# Patient Record
Sex: MALE | Race: WHITE | NOT HISPANIC OR LATINO
[De-identification: names, ages, dates, MRNs, and addresses within clinical notes are randomized per-mention and may not be internally consistent; named-entity substitution may affect disease eponyms.]

---

## 2018-11-19 ENCOUNTER — RECORD ABSTRACTING (OUTPATIENT)
Age: 73
End: 2018-11-19

## 2018-11-19 DIAGNOSIS — H26.9 UNSPECIFIED CATARACT: ICD-10-CM

## 2018-11-19 DIAGNOSIS — Z87.19 PERSONAL HISTORY OF OTHER DISEASES OF THE DIGESTIVE SYSTEM: ICD-10-CM

## 2018-11-19 DIAGNOSIS — G61.81 CHRONIC INFLAMMATORY DEMYELINATING POLYNEURITIS: ICD-10-CM

## 2018-11-19 DIAGNOSIS — Z86.19 PERSONAL HISTORY OF OTHER INFECTIOUS AND PARASITIC DISEASES: ICD-10-CM

## 2018-12-13 ENCOUNTER — APPOINTMENT (OUTPATIENT)
Dept: INTERNAL MEDICINE | Facility: CLINIC | Age: 73
End: 2018-12-13
Payer: COMMERCIAL

## 2018-12-13 ENCOUNTER — TRANSCRIPTION ENCOUNTER (OUTPATIENT)
Age: 73
End: 2018-12-13

## 2018-12-13 VITALS
BODY MASS INDEX: 24.05 KG/M2 | SYSTOLIC BLOOD PRESSURE: 100 MMHG | HEART RATE: 56 BPM | WEIGHT: 168 LBS | DIASTOLIC BLOOD PRESSURE: 42 MMHG | HEIGHT: 70 IN

## 2018-12-13 LAB
BILIRUB UR QL STRIP: NORMAL
GLUCOSE UR-MCNC: NORMAL
HCG UR QL: NORMAL EU/DL
HGB UR QL STRIP.AUTO: NORMAL
KETONES UR-MCNC: NORMAL
LEUKOCYTE ESTERASE UR QL STRIP: NORMAL
NITRITE UR QL STRIP: NORMAL
PH UR STRIP: 5
PROT UR STRIP-MCNC: NORMAL
SP GR UR STRIP: 1.01

## 2018-12-13 PROCEDURE — 99243 OFF/OP CNSLTJ NEW/EST LOW 30: CPT | Mod: 25

## 2018-12-13 PROCEDURE — 81003 URINALYSIS AUTO W/O SCOPE: CPT | Mod: QW

## 2018-12-13 NOTE — HISTORY OF PRESENT ILLNESS
[Atrial Fibrillation] : atrial fibrillation [Chronic Anticoagulation] : chronic anticoagulation [Aortic Stenosis] : no aortic stenosis [Coronary Artery Disease] : no coronary artery disease [Recent Myocardial Infarction] : no recent myocardial infarction [Implantable Device/Pacemaker] : no implantable device/pacemaker [Asthma] : no asthma [COPD] : no COPD [Sleep Apnea] : no sleep apnea [Smoker] : not a smoker [Family Member] : no family member with adverse anesthesia reaction/sudden death [Self] : no previous adverse anesthesia reaction [Chronic Kidney Disease] : no chronic kidney disease [Diabetes] : no diabetes [FreeTextEntry4] : preop clearance requested by Dr. Harshad Nino prior to left inguinal hernia surgery on December 17, 2018 at [place]

## 2018-12-13 NOTE — PHYSICAL EXAM

## 2018-12-13 NOTE — REVIEW OF SYSTEMS
[Fever] : no fever [Chills] : no chills [Vision Problems] : no vision problems [Earache] : no earache [Chest Pain] : no chest pain [Palpitations] : no palpitations [Orthopena] : no orthopnea [Paroysmal Nocturnal Dyspnea] : no paroysmal nocturnal dyspnea [Shortness Of Breath] : no shortness of breath [Wheezing] : no wheezing [Cough] : no cough [Dyspnea on Exertion] : not dyspnea on exertion [Abdominal Pain] : no abdominal pain [Nausea] : no nausea [Vomiting] : no vomiting [Heartburn] : no heartburn [Dysuria] : no dysuria [Hematuria] : no hematuria [Frequency] : no frequency [Joint Pain] : no joint pain [Skin Rash] : no skin rash [Headache] : no headache [Dizziness] : no dizziness [Depression] : no depression [Easy Bleeding] : no easy bleeding [Easy Bruising] : no easy bruising [de-identified] : +CIPD (chronic inflammatory polyneuropathy disease)

## 2018-12-13 NOTE — ASSESSMENT
[FreeTextEntry4] : I reviewed all the laboratory data from today including EKG done at [facility]. plan with within normal limits. He is medically cleared to have surgery. hold his Coumadin for 3 days prior to surgery and to restart the day of surgery. no aspirin or NSAIDs  prior to surgery.

## 2019-06-07 ENCOUNTER — TRANSCRIPTION ENCOUNTER (OUTPATIENT)
Age: 74
End: 2019-06-07

## 2019-09-26 ENCOUNTER — APPOINTMENT (OUTPATIENT)
Dept: INTERNAL MEDICINE | Facility: CLINIC | Age: 74
End: 2019-09-26
Payer: COMMERCIAL

## 2019-09-26 ENCOUNTER — NON-APPOINTMENT (OUTPATIENT)
Age: 74
End: 2019-09-26

## 2019-09-26 VITALS
WEIGHT: 164 LBS | SYSTOLIC BLOOD PRESSURE: 100 MMHG | HEART RATE: 56 BPM | BODY MASS INDEX: 23.53 KG/M2 | DIASTOLIC BLOOD PRESSURE: 50 MMHG

## 2019-09-26 DIAGNOSIS — G90.09 OTHER IDIOPATHIC PERIPHERAL AUTONOMIC NEUROPATHY: ICD-10-CM

## 2019-09-26 PROCEDURE — 99215 OFFICE O/P EST HI 40 MIN: CPT | Mod: 25

## 2019-09-26 PROCEDURE — 93000 ELECTROCARDIOGRAM COMPLETE: CPT

## 2019-09-26 PROCEDURE — G0444 DEPRESSION SCREEN ANNUAL: CPT

## 2019-09-26 PROCEDURE — 36415 COLL VENOUS BLD VENIPUNCTURE: CPT

## 2019-09-26 NOTE — PHYSICAL EXAM
[No Acute Distress] : no acute distress [Well Nourished] : well nourished [Well Developed] : well developed [Well-Appearing] : well-appearing [Normal Sclera/Conjunctiva] : normal sclera/conjunctiva [PERRL] : pupils equal round and reactive to light [EOMI] : extraocular movements intact [Normal Outer Ear/Nose] : the outer ears and nose were normal in appearance [Normal Oropharynx] : the oropharynx was normal [No JVD] : no jugular venous distention [No Lymphadenopathy] : no lymphadenopathy [Supple] : supple [Thyroid Normal, No Nodules] : the thyroid was normal and there were no nodules present [No Respiratory Distress] : no respiratory distress  [No Accessory Muscle Use] : no accessory muscle use [Clear to Auscultation] : lungs were clear to auscultation bilaterally [Normal Rate] : normal rate  [Normal S1, S2] : normal S1 and S2 [Regular Rhythm] : with a regular rhythm [No Murmur] : no murmur heard [No Carotid Bruits] : no carotid bruits [No Abdominal Bruit] : a ~M bruit was not heard ~T in the abdomen [No Varicosities] : no varicosities [Pedal Pulses Present] : the pedal pulses are present [No Edema] : there was no peripheral edema [No Palpable Aorta] : no palpable aorta [No Extremity Clubbing/Cyanosis] : no extremity clubbing/cyanosis [Non Tender] : non-tender [Soft] : abdomen soft [Non-distended] : non-distended [No Masses] : no abdominal mass palpated [No HSM] : no HSM [Normal Posterior Cervical Nodes] : no posterior cervical lymphadenopathy [Normal Bowel Sounds] : normal bowel sounds [No CVA Tenderness] : no CVA  tenderness [Normal Anterior Cervical Nodes] : no anterior cervical lymphadenopathy [No Spinal Tenderness] : no spinal tenderness [No Joint Swelling] : no joint swelling [Grossly Normal Strength/Tone] : grossly normal strength/tone [Coordination Grossly Intact] : coordination grossly intact [No Rash] : no rash [No Focal Deficits] : no focal deficits [Normal Gait] : normal gait [Deep Tendon Reflexes (DTR)] : deep tendon reflexes were 2+ and symmetric [Normal Affect] : the affect was normal [Normal Insight/Judgement] : insight and judgment were intact [de-identified] : right mild but dense left cataract

## 2019-09-26 NOTE — HISTORY OF PRESENT ILLNESS
[Atrial Fibrillation] : atrial fibrillation [No Pertinent Pulmonary History] : no history of asthma, COPD, sleep apnea, or smoking [No Adverse Anesthesia Reaction] : no adverse anesthesia reaction in self or family member [Chronic Anticoagulation] : chronic anticoagulation [Coronary Artery Disease] : no coronary artery disease [Aortic Stenosis] : no aortic stenosis [Recent Myocardial Infarction] : no recent myocardial infarction [Implantable Device/Pacemaker] : no implantable device/pacemaker [Asthma] : no asthma [COPD] : no COPD [Sleep Apnea] : no sleep apnea [Smoker] : not a smoker [Diabetes] : no diabetes [Chronic Kidney Disease] : no chronic kidney disease [FreeTextEntry1] : cataract surgeries [FreeTextEntry2] : 10.2 and 10.23.2019 [FreeTextEntry4] : here prior to anticipated cataract operations. He currently feels well and has had IRs in the 1.8-1.9 range without evidence of excessive bleeding. [FreeTextEntry3] : Pawel

## 2019-09-26 NOTE — PLAN
[FreeTextEntry1] : check labs, and PTT\par Refill meds but he will obtain his flecainide from his cardiologist in the future\par Return p.r.n.

## 2019-09-26 NOTE — REVIEW OF SYSTEMS
[Hearing Loss] : hearing loss [Chills] : no chills [Fever] : no fever [Vision Problems] : no vision problems [Earache] : no earache [Chest Pain] : no chest pain [Palpitations] : no palpitations [Orthopena] : no orthopnea [Shortness Of Breath] : no shortness of breath [Wheezing] : no wheezing [Cough] : no cough [Dyspnea on Exertion] : not dyspnea on exertion [Abdominal Pain] : no abdominal pain [Nausea] : no nausea [Vomiting] : no vomiting [Heartburn] : no heartburn [Dysuria] : no dysuria [Hematuria] : no hematuria [Joint Pain] : no joint pain [Frequency] : no frequency [Skin Rash] : no skin rash [Headache] : no headache [Depression] : no depression [Dizziness] : no dizziness [Easy Bruising] : no easy bruising [Easy Bleeding] : no easy bleeding [FreeTextEntry3] : Last eye exam 1 week ago [de-identified] : +CIPD (chronic inflammatory polyneuropathy disease)

## 2019-09-26 NOTE — ASSESSMENT
[Patient Optimized for Surgery] : Patient optimized for surgery [ECG] : ECG [Modify anticoagulant treatment prior to procedure] : Modify anticoagulant treatment prior to procedure [FreeTextEntry4] :  He is medically cleared to have surgery. I would hold his Coumadin for 3 days prior to surgery and to restart the day of surgery. No aspirin or NSAIDs  prior to surgery.

## 2019-09-28 ENCOUNTER — RESULT REVIEW (OUTPATIENT)
Age: 74
End: 2019-09-28

## 2019-09-28 LAB
ALBUMIN SERPL ELPH-MCNC: 4.5 G/DL
ALP BLD-CCNC: 51 U/L
ALT SERPL-CCNC: 22 U/L
ANION GAP SERPL CALC-SCNC: 13 MMOL/L
APPEARANCE: CLEAR
AST SERPL-CCNC: 25 U/L
BASOPHILS # BLD AUTO: 0.02 K/UL
BASOPHILS NFR BLD AUTO: 0.5 %
BILIRUB SERPL-MCNC: 0.5 MG/DL
BILIRUBIN URINE: NEGATIVE
BLOOD URINE: NEGATIVE
BUN SERPL-MCNC: 21 MG/DL
CALCIUM SERPL-MCNC: 9.9 MG/DL
CHLORIDE SERPL-SCNC: 99 MMOL/L
CHOLEST SERPL-MCNC: 201 MG/DL
CHOLEST/HDLC SERPL: 4.3 RATIO
CO2 SERPL-SCNC: 26 MMOL/L
COLOR: YELLOW
CREAT SERPL-MCNC: 0.85 MG/DL
EOSINOPHIL # BLD AUTO: 0.14 K/UL
EOSINOPHIL NFR BLD AUTO: 3.2 %
GLUCOSE QUALITATIVE U: NEGATIVE
GLUCOSE SERPL-MCNC: 117 MG/DL
HCT VFR BLD CALC: 43.8 %
HCV AB SER QL: NONREACTIVE
HCV S/CO RATIO: 0.25 S/CO
HDLC SERPL-MCNC: 47 MG/DL
HGB BLD-MCNC: 14 G/DL
IMM GRANULOCYTES NFR BLD AUTO: 0.7 %
INR PPP: 1.97 RATIO
KETONES URINE: NEGATIVE
LDLC SERPL CALC-MCNC: 129 MG/DL
LEUKOCYTE ESTERASE URINE: NEGATIVE
LYMPHOCYTES # BLD AUTO: 0.58 K/UL
LYMPHOCYTES NFR BLD AUTO: 13.3 %
MAN DIFF?: NORMAL
MCHC RBC-ENTMCNC: 32 GM/DL
MCHC RBC-ENTMCNC: 32.6 PG
MCV RBC AUTO: 101.9 FL
MONOCYTES # BLD AUTO: 0.54 K/UL
MONOCYTES NFR BLD AUTO: 12.4 %
NEUTROPHILS # BLD AUTO: 3.04 K/UL
NEUTROPHILS NFR BLD AUTO: 69.9 %
NITRITE URINE: NEGATIVE
PH URINE: 6
PLATELET # BLD AUTO: 216 K/UL
POTASSIUM SERPL-SCNC: 5 MMOL/L
PROT SERPL-MCNC: 7.5 G/DL
PROTEIN URINE: NEGATIVE
PSA SERPL-MCNC: 2.69 NG/ML
PT BLD: 22.9 SEC
RBC # BLD: 4.3 M/UL
RBC # FLD: 14.1 %
SODIUM SERPL-SCNC: 138 MMOL/L
SPECIFIC GRAVITY URINE: 1.02
TRIGL SERPL-MCNC: 127 MG/DL
TSH SERPL-ACNC: 1.96 UIU/ML
UROBILINOGEN URINE: NORMAL
VIT B12 SERPL-MCNC: 683 PG/ML
WBC # FLD AUTO: 4.35 K/UL

## 2019-09-30 LAB — METHYLMALONATE SERPL-SCNC: 235 NMOL/L

## 2019-10-02 DIAGNOSIS — Z87.438 PERSONAL HISTORY OF OTHER DISEASES OF MALE GENITAL ORGANS: ICD-10-CM

## 2019-10-04 ENCOUNTER — APPOINTMENT (OUTPATIENT)
Dept: CARDIOLOGY | Facility: CLINIC | Age: 74
End: 2019-10-04
Payer: COMMERCIAL

## 2019-10-04 VITALS
SYSTOLIC BLOOD PRESSURE: 110 MMHG | DIASTOLIC BLOOD PRESSURE: 70 MMHG | WEIGHT: 164 LBS | HEART RATE: 53 BPM | BODY MASS INDEX: 23.53 KG/M2 | OXYGEN SATURATION: 97 %

## 2019-10-04 DIAGNOSIS — Z86.69 PERSONAL HISTORY OF OTHER DISEASES OF THE NERVOUS SYSTEM AND SENSE ORGANS: ICD-10-CM

## 2019-10-04 DIAGNOSIS — Z86.39 PERSONAL HISTORY OF OTHER ENDOCRINE, NUTRITIONAL AND METABOLIC DISEASE: ICD-10-CM

## 2019-10-04 PROCEDURE — 93000 ELECTROCARDIOGRAM COMPLETE: CPT

## 2019-10-04 PROCEDURE — 99205 OFFICE O/P NEW HI 60 MIN: CPT

## 2019-10-06 ENCOUNTER — NON-APPOINTMENT (OUTPATIENT)
Age: 74
End: 2019-10-06

## 2019-10-06 PROBLEM — Z86.39 HISTORY OF HYPERLIPIDEMIA: Status: RESOLVED | Noted: 2019-10-06 | Resolved: 2019-10-06

## 2019-10-06 PROBLEM — Z86.69 HISTORY OF DEAFNESS: Status: RESOLVED | Noted: 2019-10-06 | Resolved: 2019-10-06

## 2019-10-06 NOTE — HISTORY OF PRESENT ILLNESS
[FreeTextEntry1] : 74-year-old\par Cardiology consultation requested by Dr. Madison because of paroxysmal atrial fibrillation\par \par  Enmanuel carries a diagnosis of paroxysmal atrial fibrillation. There is no history of a myocardial infarction angina or congestive heart failure. Paroxysmal atrial fibrillation was initially detected in 2010 manifested by palpitations. The electrocardiographic documentation of atrial fibrillation is not available. Prior treatment with amiodarone and sotalol were either ineffective or not tolerated. He has been maintained on vitamin K antagonist therapy\par \par A stress sestamibi study prior to 2010 was reportedly normal. A subsequent stress study was also described as  normal. Those studies are not available for review. A 3/12 echocardiogram demonstrated an increase in left atrial volume at 37 mL/m2 with normal left atrial diameter. Trace/physiological mitral regurgitation was noted. 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 He describes these palpitations as representing atrial  fibrillation.  One episode was alleviated by coughing. Fuentes denies exertional chest pain or syncope.\par \par There is no history of hypertension or diabetes. He has known hyperlipidemia. Prior treatment with HMG coag reductase inhibitor therapy was discontinued due to concerns for adverse effects. 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 He is accompanied by Qian Olivier.\par \par \par

## 2019-10-06 NOTE — DISCUSSION/SUMMARY
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 There is no history of a myocardial infarction angina or congestive heart failure. Paroxysmal atrial fibrillation was initially detected in 2010 manifested by palpitations. The electrocardiographic documentation of atrial fibrillation is not available. Prior treatment with amiodarone and sotalol were either ineffective or not tolerated. He has been  maintained on vitamin K antagonists therapy\par \par A stress sestamibi study prior to 2010 was reportedly normal. A subsequent stress study was also described as normal. Those studies are not available for review. A 3/12 echocardiogram demonstrated an increase in left atrial volume at 37 mL/m2 with normal left atrial diameter. Trace/physiological mitral regurgitation was noted. The left ventricular ejection fraction was 58%\par \par Comment\par The working diagnosis is paroxysmal atrial fibrillation secondary to  a "lone fibrillator" i.e. atrial fibrillation without underlying structural heart disease. The history is consistent with this diagnosis. The termination of tachycardia with vagal maneuvers suggests the presence of a more organized supraventricular tachycardia. Flecainide is contraindicated in the presence of significant coronary disease. There is however no evidence of such to date. Prior stress tests were reportedly normal and the resting 10/19 electrocardiogram  shows no evidence of myocardial ischemia or infarction.\par Atrial  fibrillation is expected to recur despite the present medical regimen. The main clinical decision involves whether or not to treat by pulmonary vein isolation/radiofrequency ablation. The degree to which the symptoms bother Dr. Singer will be a major influence on therapy. A noninvasive cardiac reevaluation would be helpful for management decisions.\par \par \par I have recommended the following:\par  a. Continue the present medical regimen\par  b. Prior cardiac studies specifically electrocardiographic documentation of atrial fibrillation, previous stress studies and echocardiograms not available at this time are requested for review\par c. Noninvasive cardiac reevaluation to include\par     1. Echocardiogram\par     2. Stress treadmill ECG study\par     3. Zio patch/event recorder 2 week study\par \par \par \par \par Hyperlipidemia\par Hyperlipidemia represents a risk factor for the development of atherosclerotic heart disease. 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 The target LDL level for primary prevention is about 100. In 9/19 the serum cholesterol level was 201 triglycerides 127 HDL 47 and . Nonpharmacological therapy, specifically diet and exercise are emphasized as major aspects of treatment.\par \par I have recommended the following:\par 1 low-fat low-cholesterol diet. Regular aerobic exercise\par 2 target LDL level to about 100 as discussed above. \par 3. Noninvasive cardiac reevaluation to include\par    a. Echocardiogram\par    b. Stress treadmill ECG study

## 2019-10-06 NOTE — PHYSICAL EXAM
[Normal Conjunctiva] : the conjunctiva exhibited no abnormalities [Heart Rate And Rhythm] : heart rate and rhythm were normal [Heart Sounds] : normal S1 and S2 [Auscultation Breath Sounds / Voice Sounds] : lungs were clear to auscultation bilaterally [Bowel Sounds] : normal bowel sounds [Abdomen Soft] : soft [Abdomen Tenderness] : non-tender [Abnormal Walk] : normal gait [] : no rash [Affect] : the affect was normal [FreeTextEntry1] : pleasant

## 2019-10-16 ENCOUNTER — RX RENEWAL (OUTPATIENT)
Age: 74
End: 2019-10-16

## 2019-10-17 ENCOUNTER — APPOINTMENT (OUTPATIENT)
Dept: CARDIOLOGY | Facility: CLINIC | Age: 74
End: 2019-10-17
Payer: COMMERCIAL

## 2019-10-17 PROCEDURE — 93306 TTE W/DOPPLER COMPLETE: CPT

## 2019-10-20 DIAGNOSIS — Z86.79 PERSONAL HISTORY OF OTHER DISEASES OF THE CIRCULATORY SYSTEM: ICD-10-CM

## 2019-10-23 PROCEDURE — 0298T: CPT

## 2019-12-16 ENCOUNTER — APPOINTMENT (OUTPATIENT)
Dept: CARDIOLOGY | Facility: CLINIC | Age: 74
End: 2019-12-16
Payer: MEDICARE

## 2019-12-16 ENCOUNTER — RX RENEWAL (OUTPATIENT)
Age: 74
End: 2019-12-16

## 2019-12-16 PROCEDURE — 93015 CV STRESS TEST SUPVJ I&R: CPT

## 2021-06-22 ENCOUNTER — APPOINTMENT (OUTPATIENT)
Dept: INTERNAL MEDICINE | Facility: CLINIC | Age: 76
End: 2021-06-22
Payer: MEDICARE

## 2021-06-22 ENCOUNTER — NON-APPOINTMENT (OUTPATIENT)
Age: 76
End: 2021-06-22

## 2021-06-22 VITALS
HEIGHT: 70 IN | SYSTOLIC BLOOD PRESSURE: 120 MMHG | HEART RATE: 55 BPM | WEIGHT: 166 LBS | DIASTOLIC BLOOD PRESSURE: 60 MMHG | BODY MASS INDEX: 23.77 KG/M2

## 2021-06-22 PROCEDURE — 99215 OFFICE O/P EST HI 40 MIN: CPT | Mod: 25

## 2021-06-22 PROCEDURE — 93000 ELECTROCARDIOGRAM COMPLETE: CPT

## 2021-06-22 RX ORDER — ELECTROLYTES/DEXTROSE
SOLUTION, ORAL ORAL DAILY
Qty: 100 | Refills: 2 | Status: ACTIVE | COMMUNITY
Start: 2021-06-22

## 2021-06-22 NOTE — HISTORY OF PRESENT ILLNESS
[Atrial Fibrillation] : atrial fibrillation [No Pertinent Pulmonary History] : no history of asthma, COPD, sleep apnea, or smoking [No Adverse Anesthesia Reaction] : no adverse anesthesia reaction in self or family member [Chronic Anticoagulation] : chronic anticoagulation [(Patient denies any chest pain, claudication, dyspnea on exertion, orthopnea, palpitations or syncope)] : Patient denies any chest pain, claudication, dyspnea on exertion, orthopnea, palpitations or syncope [Aortic Stenosis] : no aortic stenosis [Coronary Artery Disease] : no coronary artery disease [Recent Myocardial Infarction] : no recent myocardial infarction [Implantable Device/Pacemaker] : no implantable device/pacemaker [Asthma] : no asthma [COPD] : no COPD [Sleep Apnea] : no sleep apnea [Smoker] : not a smoker [Chronic Kidney Disease] : no chronic kidney disease [Diabetes] : no diabetes [FreeTextEntry1] : left mastectomy, sentinel node biopsy [FreeTextEntry2] : 6/23/2021 or 6/25/2021 [FreeTextEntry3] : Doyle [FreeTextEntry4] : here prior to anticipated left mastectomy. 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 He llast his warfarin Friday, June 18  holding it in preparation for anticipated surgery. [FreeTextEntry7] : EST 12/16/2019 negative to 80% max predicted HR

## 2021-06-22 NOTE — REVIEW OF SYSTEMS
[Hearing Loss] : hearing loss [Joint Pain] : joint pain [Fever] : no fever [Chills] : no chills [Vision Problems] : no vision problems [Earache] : no earache [Chest Pain] : no chest pain [Palpitations] : no palpitations [Orthopena] : no orthopnea [Shortness Of Breath] : no shortness of breath [Wheezing] : no wheezing [Cough] : no cough [Dyspnea on Exertion] : not dyspnea on exertion [Abdominal Pain] : no abdominal pain [Nausea] : no nausea [Vomiting] : no vomiting [Heartburn] : no heartburn [Dysuria] : no dysuria [Hematuria] : no hematuria [Frequency] : no frequency [Skin Rash] : no skin rash [Headache] : no headache [Dizziness] : no dizziness [Easy Bleeding] : no easy bleeding [Easy Bruising] : no easy bruising [FreeTextEntry3] : Last eye exam 1 week ago [FreeTextEntry9] : left hip [de-identified] : +CIPD (chronic inflammatory polyneuropathy disease)

## 2021-06-22 NOTE — PHYSICAL EXAM
[Well Nourished] : well nourished [Well Developed] : well developed [Well-Appearing] : well-appearing [Normal Sclera/Conjunctiva] : normal sclera/conjunctiva [PERRL] : pupils equal round and reactive to light [EOMI] : extraocular movements intact [Normal Outer Ear/Nose] : the outer ears and nose were normal in appearance [Normal Oropharynx] : the oropharynx was normal [No JVD] : no jugular venous distention [Supple] : supple [No Lymphadenopathy] : no lymphadenopathy [No Respiratory Distress] : no respiratory distress  [No Accessory Muscle Use] : no accessory muscle use [Clear to Auscultation] : lungs were clear to auscultation bilaterally [Normal Rate] : normal rate  [Regular Rhythm] : with a regular rhythm [Normal S1, S2] : normal S1 and S2 [No Murmur] : no murmur heard [No Carotid Bruits] : no carotid bruits [No Abdominal Bruit] : a ~M bruit was not heard ~T in the abdomen [No Varicosities] : no varicosities [No Edema] : there was no peripheral edema [No Palpable Aorta] : no palpable aorta [No Extremity Clubbing/Cyanosis] : no extremity clubbing/cyanosis [Soft] : abdomen soft [Non Tender] : non-tender [Non-distended] : non-distended [No Masses] : no abdominal mass palpated [No HSM] : no HSM [Normal Bowel Sounds] : normal bowel sounds [Normal Posterior Cervical Nodes] : no posterior cervical lymphadenopathy [Normal Anterior Cervical Nodes] : no anterior cervical lymphadenopathy [No CVA Tenderness] : no CVA  tenderness [No Spinal Tenderness] : no spinal tenderness [No Joint Swelling] : no joint swelling [Grossly Normal Strength/Tone] : grossly normal strength/tone [No Rash] : no rash [Coordination Grossly Intact] : coordination grossly intact [No Focal Deficits] : no focal deficits [Normal Gait] : normal gait [Deep Tendon Reflexes (DTR)] : deep tendon reflexes were 2+ and symmetric [Normal Affect] : the affect was normal [Normal Insight/Judgement] : insight and judgment were intact [No Acute Distress] : no acute distress [de-identified] : +soft 6 mm mobile mass around 6 o'clock left breast [de-identified] : +reducible ventral hernia [de-identified] : no ecchymoses

## 2021-06-22 NOTE — ASSESSMENT
[Patient Optimized for Surgery] : Patient optimized for surgery [ECG] : ECG [Modify anticoagulant treatment prior to procedure] : Modify anticoagulant treatment prior to procedure [Continue medications as is] : Continue current medications [FreeTextEntry4] : Will await final clearance once labs reviewed. His warfarin has already been held and should be restarted the day of surgery. No aspirin or NSAIDs  prior to surgery. I note prior fasting glucoses both here and in [place] were mildly elevated suggesting impaired fasting glucose. He reports that his A1c's have been under 6.0. In addition his last LDL cholesterol in April of this year was reported as 162. This should be followed up  by his principle PCP in [place].

## 2021-06-29 ENCOUNTER — NON-APPOINTMENT (OUTPATIENT)
Age: 76
End: 2021-06-29

## 2021-10-05 ENCOUNTER — APPOINTMENT (OUTPATIENT)
Dept: GASTROENTEROLOGY | Facility: CLINIC | Age: 76
End: 2021-10-05

## 2021-10-12 ENCOUNTER — NON-APPOINTMENT (OUTPATIENT)
Age: 76
End: 2021-10-12

## 2021-10-12 ENCOUNTER — APPOINTMENT (OUTPATIENT)
Dept: SURGERY | Facility: CLINIC | Age: 76
End: 2021-10-12
Payer: MEDICARE

## 2021-10-12 ENCOUNTER — APPOINTMENT (OUTPATIENT)
Dept: GASTROENTEROLOGY | Facility: CLINIC | Age: 76
End: 2021-10-12
Payer: MEDICARE

## 2021-10-12 VITALS
BODY MASS INDEX: 23.19 KG/M2 | DIASTOLIC BLOOD PRESSURE: 52 MMHG | TEMPERATURE: 97.7 F | WEIGHT: 162 LBS | SYSTOLIC BLOOD PRESSURE: 102 MMHG | HEART RATE: 55 BPM | HEIGHT: 70 IN

## 2021-10-12 DIAGNOSIS — Z00.00 ENCOUNTER FOR GENERAL ADULT MEDICAL EXAMINATION W/OUT ABNORMAL FINDINGS: ICD-10-CM

## 2021-10-12 PROCEDURE — 99213 OFFICE O/P EST LOW 20 MIN: CPT

## 2021-10-12 RX ORDER — ZOLPIDEM TARTRATE 10 MG/1
10 TABLET ORAL
Qty: 30 | Refills: 0 | Status: DISCONTINUED | COMMUNITY
Start: 2021-06-22 | End: 2021-10-12

## 2021-10-12 RX ORDER — WARFARIN 4 MG/1
4 TABLET ORAL DAILY
Qty: 90 | Refills: 0 | Status: DISCONTINUED | COMMUNITY
End: 2021-10-12

## 2021-10-12 RX ORDER — FLECAINIDE ACETATE 50 MG/1
50 TABLET ORAL
Refills: 0 | Status: ACTIVE | COMMUNITY

## 2021-10-12 RX ORDER — FLECAINIDE ACETATE 50 MG/1
50 TABLET ORAL
Qty: 270 | Refills: 0 | Status: DISCONTINUED | COMMUNITY
End: 2021-10-12

## 2021-10-12 NOTE — ASSESSMENT
[FreeTextEntry1] : Patient presents today to be evaluated for an new mass after an attack of having hemorrhoids.  States he had hemorrhoidals get exacerbated approximately several weeks ago and since that event he had a small lump that is noted in his buttock outside the anal region.  He has no complaints of pain in the area no other complaints regarding this other than the palpable finding.\par With regard to his breast he has no complaints.  He was recently seen and evaluated by his oncologist Dr. Ngo\par \par Physical exam: Patient's breast was examined erect and supine position.  He has very well-healed scars there is no mass on the chest wall there is no axillary adenopathy on the left side.\par Rectal examination patient did not undergo a rectal exam.  He does have a mass noted in his buttock on the left at approximately the 9 o'clock position.  It is approximately 3 fingerbreadths from his anus.  Mass is approximately 1 mm in diameter it is firm no overlying skin changes it is not fluctuant it is nontender\par \par Impression/plan normal breast exam,left  buttock mass: With regard to the breast exam I just performed one on his left chest and left axilla due to the fact that he presented for another reason.  No acute intervention needed at this point he needs to continue his oncologic follow-up.  He will see me in approximately 3 to 6 months regarding his breast\par \par Anal/buttock mass this is noted to be on his left buttock.  This has a consistency of a cyst.  No evidence of it being infected at this time and there is no evidence of this being a suspicious mass.  No surgical intervention is needed at this point.  I have instructed the patient that if this gets larger or becomes painful to represent to the office to be reevaluated.  This mass goes away no further intervention is needed.  If it is persistently will return in 3 to 6 months time to be reevaluated.

## 2021-10-12 NOTE — HISTORY OF PRESENT ILLNESS
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 of scant fresh red blood per rectum\par recently\par \par he palpated a small nodule or papule around the anal canal\par \par no pain or slight anal irritation\par \par bms havent changed\par \par one occurrence of diverticulitis or left sided abdom pain, with neg ct findings\par and rx with augmentin [3-4 days, no change], and then combo cipro and flagyl,which exacerbated his chronic neuropathy\par so he had to stop\par \par but the symptoms spontaneously abated..\par \par has had diverticular resection\par i had been suspicious that some segment of dysmotility just above the anastomosis was contributing to the painful episodes on the left side, but the main thing we can say is that these episodes have been far less common in recent last several years\par \par July 2021, Dr 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

## 2021-10-12 NOTE — PHYSICAL EXAM
[Normal Sphincter Tone] : normal sphincter tone [No Rectal Mass] : no rectal mass [Internal Hemorrhoid] : internal hemorrhoids [External Hemorrhoid] : external hemorrhoids [Occult Blood Positive] : stool was negative for occult blood [FreeTextEntry1] : stool neg for heme, small papule left inner buttocks, probably due to a folilicle which became plugged, not a concern

## 2021-10-12 NOTE — ASSESSMENT
[FreeTextEntry1] : 1.. with scant rectal bleed, almost certainly hemorrhoidal\par 2  hemorrhoid may have been bigger several days ago, but a prolapsing or partially prolapsing hemorrhoid is still apparent\par 3.  i have sent off a specimen for a FIT test..\par 4.  because of hx of breast cancer, I did suggest a colonoscopy every five years;  last was June 2016;  dr Singer will think it over, but isnt inclined to do it now\par \par he will let me know re any persistence of bleeding\par \par we could try mesalamine suppositories

## 2021-10-13 LAB — HEMOCCULT STL QL IA: NEGATIVE

## 2021-11-30 ENCOUNTER — RESULT REVIEW (OUTPATIENT)
Age: 76
End: 2021-11-30

## 2021-12-03 ENCOUNTER — APPOINTMENT (OUTPATIENT)
Dept: GASTROENTEROLOGY | Facility: HOSPITAL | Age: 76
End: 2021-12-03

## 2021-12-07 ENCOUNTER — APPOINTMENT (OUTPATIENT)
Dept: SURGERY | Facility: CLINIC | Age: 76
End: 2021-12-07
Payer: MEDICARE

## 2021-12-07 VITALS
HEART RATE: 57 BPM | RESPIRATION RATE: 18 BRPM | HEIGHT: 70 IN | OXYGEN SATURATION: 97 % | DIASTOLIC BLOOD PRESSURE: 53 MMHG | SYSTOLIC BLOOD PRESSURE: 111 MMHG | WEIGHT: 165 LBS | BODY MASS INDEX: 23.62 KG/M2

## 2021-12-07 PROCEDURE — 46600 DIAGNOSTIC ANOSCOPY SPX: CPT

## 2021-12-07 PROCEDURE — 99212 OFFICE O/P EST SF 10 MIN: CPT | Mod: 25

## 2021-12-08 NOTE — HISTORY OF PRESENT ILLNESS
[FreeTextEntry1] : 76-year-old male here for initial evaluation for symptomatic hemorrhoids.  Patient noted to have a likely thrombosed hemorrhoid on recent colonoscopy with Dr. Hidalgo.  Patient has had pain from the hemorrhoid.  Has discomfort when sitting.  Also has some pain with bowel movements.\par \par Recently noticed a small lump or cyst near the anal opening on the left side as well.  Has never had any procedures for hemorrhoids in the past.\par \par Medical history significant for breast cancer status postmastectomy with Dr. Kwon.

## 2021-12-08 NOTE — PROCEDURE
[FreeTextEntry1] : Anoscopy: Mild to moderate internal hemorrhoids noted without active bleeding.  Somewhat painful exam due to the thrombosed external hemorrhoid

## 2021-12-08 NOTE — PHYSICAL EXAM
[Excoriation] : no perianal excoriation [Nonprolapsing] : a nonprolapsing (grade I) [Normal] : was normal [None] : there was no rectal abscess [Respiratory Effort] : normal respiratory effort [Alert] : alert [Calm] : calm [de-identified] : Thrombosed external hemorrhoid at the anal verge.  Likely subcutaneous cyst just left of the anal opening [de-identified] : No acute distress

## 2021-12-08 NOTE — ASSESSMENT
[FreeTextEntry1] : 76-year-old male with a thrombosed external hemorrhoid causing him discomfort.\par \par Discussed treatment options for hemorrhoids including warm baths and Tylenol as needed for pain control.  Does not have significant bleeding symptoms at this time.  Discussed hemorrhoidectomy in the setting of thrombosed hemorrhoid but he has had symptoms for a few weeks now so would likely just worsen overall pain.\par \par He has some concerns about the likely subcutaneous cyst.  Reassured the patient this is most likely a benign finding.  Discussed excisional hemorrhoidectomy with perioperative pain and recovery time.  If patient would like to proceed with treatment of his hemorrhoids could likely excised the subcutaneous cyst at the same time

## 2022-09-06 ENCOUNTER — TRANSCRIPTION ENCOUNTER (OUTPATIENT)
Age: 77
End: 2022-09-06

## 2022-11-14 ENCOUNTER — APPOINTMENT (OUTPATIENT)
Dept: COLORECTAL SURGERY | Facility: CLINIC | Age: 77
End: 2022-11-14

## 2022-11-14 VITALS
SYSTOLIC BLOOD PRESSURE: 133 MMHG | HEART RATE: 58 BPM | DIASTOLIC BLOOD PRESSURE: 74 MMHG | RESPIRATION RATE: 18 BRPM | OXYGEN SATURATION: 98 % | TEMPERATURE: 98 F

## 2022-11-14 PROCEDURE — 99212 OFFICE O/P EST SF 10 MIN: CPT

## 2022-11-14 NOTE — PHYSICAL EXAM
[Abdomen Masses] : No abdominal masses [Abdomen Tenderness] : ~T No ~M abdominal tenderness [No HSM] : no hepatosplenomegaly [Exam Deferred] : exam was deferred [No Rash or Lesion] : No rash or lesion [Alert] : alert [Oriented to Person] : oriented to person [Oriented to Place] : oriented to place [Oriented to Time] : oriented to time [Calm] : calm [de-identified] : Soft, non tender; scar of previous surgeries [de-identified] : Normal [de-identified] : Normal [de-identified] : Normal [de-identified] : Normal [de-identified] : Normal

## 2022-11-14 NOTE — ASSESSMENT
[FreeTextEntry1] : 77 M w/ recent episode of lower abdominal pain associated with constipation, which resolved after he had bowel movements. Since then he has been taking Miralax and is feeling better.\par On evaluating his CT scans, he has a side-to-side colo-rectal anastomosis, and it appears like the redundancy at the anastomosis is playing a role in the constipation and pain. In case these symptoms become frequent and significant, the anastomosis can be revised and converted into a end-to-end or end-to-side anastomosis.\par Plan:\par Low fiber diet.\par Plenty of oral fluids.\par Laxatives.\par See again after he returns from his vacation.\par All questions answered.

## 2022-11-14 NOTE — REVIEW OF SYSTEMS
[As Noted in HPI] : as noted in HPI [Negative] : Heme/Lymph [FreeTextEntry9] : has bilateral hip pain that affects his walking, a side effect of one of the medications he was taking

## 2022-11-14 NOTE — HISTORY OF PRESENT ILLNESS
[FreeTextEntry1] : 77 year old patient, referred by Dr. Nino, for recent episode of lower abdominal pain, which resolved after he had a bowel movement.\par \par CT abdomen and pelvis 11/11/22- No evidence of acute intra-abdominal pathology, prostatomegaly, s/p left inguinal hernia repair, punctate pancreatic calcifications could represent sequelae of chronic  pancreatitis. Bilateral lower lobe pulmonary nodules, some increased others unchanged. Colonic diverticulosis without diverticulitis, Appendix normal. Anastomotic  bowel sutures in the sigmoid colon.\par \par Was advised by Dr. Hidalgo to take extra Miralax and has been feeling better after having small liquid BM's\par In the AM has pain and passes small amount of stool and feels better\par Has 5 BM's a day\par Concerned there is an area of obstruction/stricture at the site of anastomosis\par \par Had left partial colectomy in 2003/2004 there was an abscess that they removed\par Denies fevers or chills\par Was in the ER Friday\par Took Augmentin since Saturday till this morning.\par \par

## 2023-01-23 ENCOUNTER — NON-APPOINTMENT (OUTPATIENT)
Age: 78
End: 2023-01-23

## 2023-02-01 ENCOUNTER — APPOINTMENT (OUTPATIENT)
Dept: COLORECTAL SURGERY | Facility: CLINIC | Age: 78
End: 2023-02-01
Payer: MEDICARE

## 2023-02-01 VITALS
SYSTOLIC BLOOD PRESSURE: 116 MMHG | WEIGHT: 165 LBS | BODY MASS INDEX: 23.62 KG/M2 | DIASTOLIC BLOOD PRESSURE: 63 MMHG | HEART RATE: 54 BPM | HEIGHT: 70 IN

## 2023-02-01 PROCEDURE — 99213 OFFICE O/P EST LOW 20 MIN: CPT

## 2023-02-01 NOTE — HISTORY OF PRESENT ILLNESS
[FreeTextEntry1] : 77 year old male pt last seen 11/14/22 for abdominal pain. 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 Nash.\par \par Today pt has some pain but got better with time and moving his bowels.  Pt travelled yesterday on the plane.\par \par Otherwise feeling well denies fevers chills, nausea and vomiting\par \par \par

## 2023-02-01 NOTE — ASSESSMENT
[FreeTextEntry1] : 77 M here for follow up. He is doing well with Miralax. Eating a normal diet, and having 3 bowel movements/day. He is scheduled to undergo hip replacement on 2/15.\par Plan:\par Continue with Miralax; avoid constipation.\par Can take lactulose if post-op analgesics cause constipation.\par All questions answered.

## 2023-02-01 NOTE — REVIEW OF SYSTEMS
[Negative] : Heme/Lymph [As Noted in HPI] : as noted in HPI [FreeTextEntry9] : Followed by orthopedist, having Right THR on 2/15/23

## 2023-02-01 NOTE — PHYSICAL EXAM
[Abdomen Masses] : No abdominal masses [Abdomen Tenderness] : ~T No ~M abdominal tenderness [No HSM] : no hepatosplenomegaly [Exam Deferred] : exam was deferred [No Rash or Lesion] : No rash or lesion [Alert] : alert [Oriented to Person] : oriented to person [Oriented to Place] : oriented to place [Oriented to Time] : oriented to time [Calm] : calm [de-identified] : Soft, non distended; scar of previous surgery [de-identified] : Normal [de-identified] : Normal [de-identified] : Normal [de-identified] : Normal [de-identified] : Normal

## 2023-02-03 ENCOUNTER — APPOINTMENT (OUTPATIENT)
Dept: INTERNAL MEDICINE | Facility: CLINIC | Age: 78
End: 2023-02-03
Payer: MEDICARE

## 2023-02-03 VITALS
TEMPERATURE: 97.9 F | SYSTOLIC BLOOD PRESSURE: 118 MMHG | OXYGEN SATURATION: 98 % | BODY MASS INDEX: 23.62 KG/M2 | HEIGHT: 70 IN | WEIGHT: 165 LBS | HEART RATE: 59 BPM | DIASTOLIC BLOOD PRESSURE: 58 MMHG

## 2023-02-03 DIAGNOSIS — K64.8 OTHER HEMORRHOIDS: ICD-10-CM

## 2023-02-03 DIAGNOSIS — R73.01 IMPAIRED FASTING GLUCOSE: ICD-10-CM

## 2023-02-03 DIAGNOSIS — A04.72 ENTEROCOLITIS DUE TO CLOSTRIDIUM DIFFICILE, NOT SPECIFIED AS RECURRENT: ICD-10-CM

## 2023-02-03 DIAGNOSIS — Z85.3 PERSONAL HISTORY OF MALIGNANT NEOPLASM OF BREAST: ICD-10-CM

## 2023-02-03 DIAGNOSIS — K57.92 DIVERTICULITIS OF INTESTINE, PART UNSPECIFIED, W/OUT PERFORATION OR ABSCESS W/OUT BLEEDING: ICD-10-CM

## 2023-02-03 PROCEDURE — 99203 OFFICE O/P NEW LOW 30 MIN: CPT

## 2023-02-03 PROCEDURE — 99213 OFFICE O/P EST LOW 20 MIN: CPT

## 2023-02-03 NOTE — PHYSICAL EXAM
[Normal] : normal gait, coordination grossly intact, no focal deficits [de-identified] : L mastectomy [de-identified] : L hip c limited and tender ROM

## 2023-02-03 NOTE — HISTORY OF PRESENT ILLNESS
[Atrial Fibrillation] : atrial fibrillation [No Pertinent Pulmonary History] : no history of asthma, COPD, sleep apnea, or smoking [No Adverse Anesthesia Reaction] : no adverse anesthesia reaction in self or family member [(Patient denies any chest pain, claudication, dyspnea on exertion, orthopnea, palpitations or syncope)] : Patient denies any chest pain, claudication, dyspnea on exertion, orthopnea, palpitations or syncope [Good (7-10 METs)] : Good (7-10 METs) [Chronic Anticoagulation] : no chronic anticoagulation [Chronic Kidney Disease] : no chronic kidney disease [Diabetes] : no diabetes [FreeTextEntry1] : L THR  [FreeTextEntry2] : 2/15 [FreeTextEntry3] : dr Nash [FreeTextEntry4] : Dear Dr Nash : Thank you for referring Mr. LANE for preoperative evaluation prior to L THR on  2/15.  He  is complaining of  L hip pain for the last 6 months. Anti-inflammatories and physical therapy are not helping any longer.\par \par PCP in [place] and  [place]\par

## 2023-02-03 NOTE — ASSESSMENT
[Patient Optimized for Surgery] : Patient optimized for surgery [No Further Testing Recommended] : no further testing recommended [As per surgery] : as per surgery [FreeTextEntry4] : Mr. LANE is a 77 year yo male with no h/o CAD and good exercise tolerance. He denies CP, palpitation or SOB and his EKG today is normal. He does not take blood thinners and denies sleep apnea or urinary obstruction symptoms. He does not have a h/o DVT. He will continue the prescription medications perioperatively as instructed. The morning of the procedure he will only take the Flecainide pill.\par \par Mr. LANE has a moderate risk for perioperative cardiovascular complications and will undergo the procedure as planned. I will follow him postop.\par \par  ***More than 50% of the face to face time was devoted to counseling and/or coordination of care. The discussion and/or coordination of care included: perioperative medication management.\par \par

## 2023-02-04 ENCOUNTER — TRANSCRIPTION ENCOUNTER (OUTPATIENT)
Age: 78
End: 2023-02-04

## 2023-02-15 ENCOUNTER — TRANSCRIPTION ENCOUNTER (OUTPATIENT)
Age: 78
End: 2023-02-15

## 2023-06-19 DIAGNOSIS — R19.5 OTHER FECAL ABNORMALITIES: ICD-10-CM

## 2023-07-17 NOTE — REVIEW OF SYSTEMS
Anesthesia Volume In Cc: 0 [see HPI] : see HPI [Joint Pain] : joint pain [Palpitations] : palpitations [Negative] : Heme/Lymph

## 2023-09-03 PROBLEM — R19.5 LOOSE BOWEL MOVEMENTS: Status: ACTIVE | Noted: 2023-09-03

## 2023-09-06 LAB
ALBUMIN SERPL ELPH-MCNC: 4.7 G/DL
ALP BLD-CCNC: 57 U/L
ALT SERPL-CCNC: 24 U/L
ANION GAP SERPL CALC-SCNC: 12 MMOL/L
AST SERPL-CCNC: 28 U/L
BILIRUB SERPL-MCNC: 0.5 MG/DL
BUN SERPL-MCNC: 18 MG/DL
CALCIUM SERPL-MCNC: 9.9 MG/DL
CHLORIDE SERPL-SCNC: 95 MMOL/L
CO2 SERPL-SCNC: 26 MMOL/L
CREAT SERPL-MCNC: 1.03 MG/DL
CRP SERPL-MCNC: <3 MG/L
EGFR: 75 ML/MIN/1.73M2
GLUCOSE SERPL-MCNC: 135 MG/DL
IGA SER QL IEP: 183 MG/DL
POTASSIUM SERPL-SCNC: 5 MMOL/L
PROT SERPL-MCNC: 7 G/DL
SODIUM SERPL-SCNC: 134 MMOL/L

## 2023-09-08 LAB
CDIFF BY PCR: NOT DETECTED
DEPRECATED O AND P PREP STL: NORMAL
ERYTHROCYTE [SEDIMENTATION RATE] IN BLOOD BY WESTERGREN METHOD: 19 MM/HR
TTG IGA SER IA-ACNC: <1.2 U/ML
TTG IGA SER-ACNC: NEGATIVE
TTG IGG SER IA-ACNC: 3.8 U/ML
TTG IGG SER IA-ACNC: NEGATIVE

## 2023-09-10 LAB
BACTERIA STL CULT: NORMAL
ENDOMYSIUM IGA SER QL: NEGATIVE
ENDOMYSIUM IGA TITR SER: NORMAL

## 2023-09-13 LAB
CALPROTECTIN FECAL: 13 UG/G
PANCREATIC ELASTASE, FECAL: <50 CD:794062645

## 2023-09-14 RX ORDER — PANCRELIPASE 30000; 6000; 19000 [USP'U]/1; [USP'U]/1; [USP'U]/1
6000-19000 CAPSULE, DELAYED RELEASE PELLETS ORAL
Qty: 540 | Refills: 3 | Status: ACTIVE | COMMUNITY
Start: 2023-09-14 | End: 1900-01-01

## 2023-09-19 ENCOUNTER — APPOINTMENT (OUTPATIENT)
Dept: CARDIOLOGY | Facility: CLINIC | Age: 78
End: 2023-09-19

## 2023-09-20 ENCOUNTER — APPOINTMENT (OUTPATIENT)
Dept: CARDIOLOGY | Facility: CLINIC | Age: 78
End: 2023-09-20
Payer: MEDICARE

## 2023-09-20 VITALS
HEIGHT: 70 IN | OXYGEN SATURATION: 99 % | BODY MASS INDEX: 23.77 KG/M2 | HEART RATE: 60 BPM | SYSTOLIC BLOOD PRESSURE: 126 MMHG | WEIGHT: 166 LBS | DIASTOLIC BLOOD PRESSURE: 64 MMHG

## 2023-09-20 DIAGNOSIS — K86.89 OTHER SPECIFIED DISEASES OF PANCREAS: ICD-10-CM

## 2023-09-20 DIAGNOSIS — Z78.9 OTHER SPECIFIED HEALTH STATUS: ICD-10-CM

## 2023-09-20 PROCEDURE — 99215 OFFICE O/P EST HI 40 MIN: CPT | Mod: 25

## 2023-09-20 PROCEDURE — 93000 ELECTROCARDIOGRAM COMPLETE: CPT | Mod: 59

## 2023-09-20 PROCEDURE — 93246 EXT ECG>7D<15D RECORDING: CPT

## 2023-09-20 RX ORDER — CARVEDILOL 6.25 MG/1
6.25 TABLET, FILM COATED ORAL
Qty: 180 | Refills: 3 | Status: ACTIVE | COMMUNITY
Start: 2023-09-20 | End: 1900-01-01

## 2023-09-23 ENCOUNTER — NON-APPOINTMENT (OUTPATIENT)
Age: 78
End: 2023-09-23

## 2023-09-23 LAB
FAT STL QN: NORMAL
FAT STL QN: NORMAL

## 2023-09-27 ENCOUNTER — NON-APPOINTMENT (OUTPATIENT)
Age: 78
End: 2023-09-27

## 2023-10-05 ENCOUNTER — APPOINTMENT (OUTPATIENT)
Dept: GASTROENTEROLOGY | Facility: CLINIC | Age: 78
End: 2023-10-05
Payer: MEDICARE

## 2023-10-05 VITALS
DIASTOLIC BLOOD PRESSURE: 68 MMHG | HEART RATE: 61 BPM | OXYGEN SATURATION: 98 % | HEIGHT: 68 IN | BODY MASS INDEX: 24.4 KG/M2 | SYSTOLIC BLOOD PRESSURE: 126 MMHG | WEIGHT: 161 LBS

## 2023-10-05 DIAGNOSIS — K52.9 NONINFECTIVE GASTROENTERITIS AND COLITIS, UNSPECIFIED: ICD-10-CM

## 2023-10-05 DIAGNOSIS — K56.609 UNSPECIFIED INTESTINAL OBSTRUCTION, UNSPECIFIED AS TO PARTIAL VERSUS COMPLETE OBSTRUCTION: ICD-10-CM

## 2023-10-05 PROCEDURE — 99214 OFFICE O/P EST MOD 30 MIN: CPT

## 2023-10-05 RX ORDER — NEBIVOLOL HYDROCHLORIDE 5 MG/1
5 TABLET ORAL DAILY
Qty: 90 | Refills: 3 | Status: DISCONTINUED | COMMUNITY
End: 2023-10-05

## 2023-10-06 PROBLEM — K86.89 PANCREATIC INSUFFICIENCY: Status: RESOLVED | Noted: 2023-09-14 | Resolved: 2023-10-06

## 2023-10-06 PROBLEM — Z78.9 SOCIAL ALCOHOL USE: Status: ACTIVE | Noted: 2023-10-06

## 2023-10-06 RX ORDER — CHROMIUM 200 MCG
TABLET ORAL
Refills: 0 | Status: ACTIVE | COMMUNITY

## 2023-10-06 RX ORDER — PANCRELIPASE 30000; 6000; 19000 [USP'U]/1; [USP'U]/1; [USP'U]/1
6000-19000 CAPSULE, DELAYED RELEASE PELLETS ORAL
Refills: 0 | Status: ACTIVE | COMMUNITY

## 2023-10-18 ENCOUNTER — RESULT REVIEW (OUTPATIENT)
Age: 78
End: 2023-10-18

## 2023-10-20 ENCOUNTER — NON-APPOINTMENT (OUTPATIENT)
Age: 78
End: 2023-10-20

## 2023-10-20 PROCEDURE — 93248 EXT ECG>7D<15D REV&INTERPJ: CPT

## 2023-10-21 ENCOUNTER — NON-APPOINTMENT (OUTPATIENT)
Age: 78
End: 2023-10-21

## 2024-04-15 ENCOUNTER — APPOINTMENT (OUTPATIENT)
Dept: CARDIOLOGY | Facility: CLINIC | Age: 79
End: 2024-04-15
Payer: MEDICARE

## 2024-04-15 DIAGNOSIS — E78.5 HYPERLIPIDEMIA, UNSPECIFIED: ICD-10-CM

## 2024-04-15 DIAGNOSIS — I48.0 PAROXYSMAL ATRIAL FIBRILLATION: ICD-10-CM

## 2024-04-15 PROCEDURE — 93000 ELECTROCARDIOGRAM COMPLETE: CPT

## 2024-04-15 PROCEDURE — 99215 OFFICE O/P EST HI 40 MIN: CPT

## 2024-04-15 RX ORDER — DRONEDARONE 400 MG/1
400 TABLET, FILM COATED ORAL
Qty: 180 | Refills: 3 | Status: ACTIVE | COMMUNITY
Start: 2024-04-15 | End: 1900-01-01

## 2024-04-15 RX ORDER — CARVEDILOL 12.5 MG/1
12.5 TABLET, FILM COATED ORAL
Qty: 180 | Refills: 3 | Status: ACTIVE | COMMUNITY
Start: 2024-04-15 | End: 1900-01-01

## 2024-04-15 NOTE — DISCUSSION/SUMMARY
[FreeTextEntry1] : Paroxysmal atrial fibrillation Dr. Singer carries a diagnosis of paroxysmal atrial fibrillation. There is no history of a myocardial infarction angina or congestive heart failure. Paroxysmal atrial fibrillation was initially detected in  manifested by palpitations. The electrocardiographic documentation of atrial fibrillation is not available. Prior treatment with amiodarone and sotalol were either ineffective or not tolerated. He  previously had  been  maintained on vitamin K antagonists therapy   Comment The working diagnosis is paroxysmal atrial fibrillation secondary to  a "lone fibrillator" i.e. atrial fibrillation without underlying structural heart disease. The history is consistent with this diagnosis..A prior stress nuclear study was reportedly normal.  A  exercise treadmill ECG study although submaximal showed no evidence of myocardial ischemia.  Previous echocardiographic studies have failed to demonstrate any significant valvular pathology a 10/19 echocardiogram showed a left ventricular ejection fraction of 59% was also normal  Atrial  fibrillation is expected to recur despite the present medical regimen.The main clinical decisions involves whether or not to perform  pulmonary vein isolation/radiofrequency ablation and treatment with anticoagulation. Carvedilol may be better tolerated than the present treatment with Bystolic   I have recommended the following:  Discontinue Bystolic Carvedilol 6.25 mg twice daily with further dose adjustment dictated by tolerance and response Echocardiogram Zio patch/2-week mobile telemetry study Factor Xa inhibitor therapy dependent on the above studies the patient's clinical course and consent to treatment Electrophysiological studies/pulmonary vein isolation depending on the patient's clinical course     Hyperlipidemia Hyperlipidemia represents a risk factor for the development of atherosclerotic heart disease. 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 The target LDL level for primary prevention is about 100. In  the serum cholesterol level was 201 triglycerides 127 HDL 47 and . More recent lipid levels are not available for review   Nonpharmacological therapy, specifically diet and exercise are emphasized as major aspects of treatment.  I have recommended the followin low-fat low-cholesterol diet. Regular aerobic exercise 2 target LDL level to about 100 as discussed above.    The diagnosis, prognosis, risks, options and alternatives were explained at length to the patient.  All questions were answered.  Issues discussed include atrial fibrillation anticoagulation beta-blocker therapy pancreatic insufficiency hyperlipidemia and noninvasive cardiac testing

## 2024-04-15 NOTE — HISTORY OF PRESENT ILLNESS
[FreeTextEntry1] :  78-year-old man Unscheduled visit because of multiple symptoms including paresthesias in the upper extremities leg weakness imbalance.  Previously followed here because of paroxysmal atrial fibrillation and hyperlipidemia.  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  He has undergone extensive neurological/neurosurgical evaluations and imaging studies.  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  He denies chest pain shortness of breath palpitations or syncope.

## 2024-04-15 NOTE — REVIEW OF SYSTEMS
[Feeling Fatigued] : feeling fatigued [Palpitations] : palpitations [Joint Pain] : joint pain [Negative] : Heme/Lymph [FreeTextEntry5] : see  history of present illness [FreeTextEntry7] : pancreatic insufficiency

## 2024-04-19 ENCOUNTER — NON-APPOINTMENT (OUTPATIENT)
Age: 79
End: 2024-04-19

## 2024-04-19 DIAGNOSIS — M48.9 SPONDYLOPATHY, UNSPECIFIED: ICD-10-CM

## 2024-04-19 PROBLEM — E78.5 HYPERLIPIDEMIA: Status: ACTIVE | Noted: 2023-10-06

## 2024-04-19 PROBLEM — I48.0 PAROXYSMAL ATRIAL FIBRILLATION: Status: RESOLVED | Noted: 2018-11-19 | Resolved: 2024-04-19

## 2024-04-19 PROBLEM — I48.0 PAROXYSMAL ATRIAL FIBRILLATION: Status: ACTIVE | Noted: 2019-09-26

## 2024-04-19 RX ORDER — LACTOBACILLUS ACIDOPHILUS/PECT 30 MG-20MG
TABLET ORAL
Refills: 0 | Status: ACTIVE | COMMUNITY

## 2024-04-19 RX ORDER — FLECAINIDE ACETATE 50 MG/1
TABLET ORAL
Refills: 0 | Status: ACTIVE | COMMUNITY

## 2024-05-31 DIAGNOSIS — K59.09 OTHER CONSTIPATION: ICD-10-CM

## 2024-05-31 RX ORDER — LACTULOSE 10 G/10G
10 SOLUTION ORAL
Qty: 120 | Refills: 2 | Status: ACTIVE | COMMUNITY
Start: 2024-05-31 | End: 1900-01-01

## 2024-06-01 ENCOUNTER — NON-APPOINTMENT (OUTPATIENT)
Age: 79
End: 2024-06-01

## 2024-06-28 ENCOUNTER — NON-APPOINTMENT (OUTPATIENT)
Age: 79
End: 2024-06-28

## 2025-04-30 ENCOUNTER — APPOINTMENT (OUTPATIENT)
Dept: ORTHOPEDIC SURGERY | Facility: CLINIC | Age: 80
End: 2025-04-30

## 2025-04-30 ENCOUNTER — RESULT REVIEW (OUTPATIENT)
Age: 80
End: 2025-04-30

## 2025-05-09 ENCOUNTER — RESULT REVIEW (OUTPATIENT)
Age: 80
End: 2025-05-09

## 2025-05-12 ENCOUNTER — APPOINTMENT (OUTPATIENT)
Dept: ENDOCRINOLOGY | Facility: CLINIC | Age: 80
End: 2025-05-12
Payer: MEDICARE

## 2025-05-12 VITALS
BODY MASS INDEX: 24.78 KG/M2 | DIASTOLIC BLOOD PRESSURE: 71 MMHG | WEIGHT: 163 LBS | HEART RATE: 61 BPM | SYSTOLIC BLOOD PRESSURE: 124 MMHG

## 2025-05-12 DIAGNOSIS — G43.909 MIGRAINE, UNSPECIFIED, NOT INTRACTABLE, W/OUT STATUS MIGRAINOSUS: ICD-10-CM

## 2025-05-12 DIAGNOSIS — E04.1 NONTOXIC SINGLE THYROID NODULE: ICD-10-CM

## 2025-05-12 DIAGNOSIS — L40.9 PSORIASIS, UNSPECIFIED: ICD-10-CM

## 2025-05-12 DIAGNOSIS — Z84.89 FAMILY HISTORY OF OTHER SPECIFIED CONDITIONS: ICD-10-CM

## 2025-05-12 DIAGNOSIS — M81.0 AGE-RELATED OSTEOPOROSIS W/OUT CURRENT PATHOLOGICAL FRACTURE: ICD-10-CM

## 2025-05-12 DIAGNOSIS — R91.1 SOLITARY PULMONARY NODULE: ICD-10-CM

## 2025-05-12 PROCEDURE — 99205 OFFICE O/P NEW HI 60 MIN: CPT

## 2025-05-12 RX ORDER — CLOBETASOL PROPIONATE 0.5 MG/G
OINTMENT TOPICAL
Refills: 0 | Status: ACTIVE | COMMUNITY

## 2025-05-12 RX ORDER — PSYLLIUM HUSK 0.4 G
CAPSULE ORAL
Refills: 0 | Status: ACTIVE | COMMUNITY

## 2025-05-12 RX ORDER — ESCITALOPRAM OXALATE 5 MG/1
TABLET, FILM COATED ORAL
Refills: 0 | Status: ACTIVE | COMMUNITY

## 2025-05-12 RX ORDER — OMEGA-3/DHA/EPA/FISH OIL 300-1000MG
CAPSULE ORAL
Refills: 0 | Status: ACTIVE | COMMUNITY

## 2025-05-12 RX ORDER — LORATADINE 5 MG
TABLET,CHEWABLE ORAL
Refills: 0 | Status: ACTIVE | COMMUNITY

## 2025-05-20 RX ORDER — PEN NEEDLE, DIABETIC 29 G X1/2"
31G X 5 MM NEEDLE, DISPOSABLE MISCELLANEOUS
Qty: 90 | Refills: 1 | Status: ACTIVE | COMMUNITY
Start: 2025-05-19 | End: 1900-01-01

## 2025-05-21 RX ORDER — ABALOPARATIDE 2000 UG/ML
3120 INJECTION, SOLUTION SUBCUTANEOUS DAILY
Qty: 3 | Refills: 1 | Status: ACTIVE | COMMUNITY
Start: 2025-05-19 | End: 1900-01-01

## 2025-06-02 DIAGNOSIS — Z97.4 PRESENCE OF EXTERNAL HEARING-AID: ICD-10-CM

## 2025-06-02 DIAGNOSIS — G43.909 MIGRAINE, UNSPECIFIED, NOT INTRACTABLE, W/OUT STATUS MIGRAINOSUS: ICD-10-CM

## 2025-06-02 DIAGNOSIS — G62.0 DRUG-INDUCED POLYNEUROPATHY: ICD-10-CM

## 2025-06-21 ENCOUNTER — NON-APPOINTMENT (OUTPATIENT)
Age: 80
End: 2025-06-21

## 2025-06-21 RX ORDER — SODIUM PICOSULFATE, MAGNESIUM OXIDE, AND ANHYDROUS CITRIC ACID 12; 3.5; 1 G/175ML; G/175ML; MG/175ML
10-3.5-12 MG-GM LIQUID ORAL
Qty: 2 | Refills: 1 | Status: ACTIVE | COMMUNITY
Start: 2025-06-21 | End: 1900-01-01

## 2025-06-23 ENCOUNTER — TRANSCRIPTION ENCOUNTER (OUTPATIENT)
Age: 80
End: 2025-06-23

## 2025-06-23 ENCOUNTER — APPOINTMENT (OUTPATIENT)
Dept: GASTROENTEROLOGY | Facility: HOSPITAL | Age: 80
End: 2025-06-23